# Patient Record
Sex: MALE | Race: WHITE | NOT HISPANIC OR LATINO | Employment: OTHER | URBAN - METROPOLITAN AREA
[De-identification: names, ages, dates, MRNs, and addresses within clinical notes are randomized per-mention and may not be internally consistent; named-entity substitution may affect disease eponyms.]

---

## 2024-08-20 ENCOUNTER — HOSPITAL ENCOUNTER (OUTPATIENT)
Facility: HOSPITAL | Age: 64
Discharge: HOME | End: 2024-08-20
Attending: EMERGENCY MEDICINE | Admitting: EMERGENCY MEDICINE
Payer: MEDICAID

## 2024-08-20 ENCOUNTER — APPOINTMENT (OUTPATIENT)
Dept: GENERAL RADIOLOGY | Facility: HOSPITAL | Age: 64
End: 2024-08-20
Payer: MEDICAID

## 2024-08-20 VITALS
WEIGHT: 185 LBS | RESPIRATION RATE: 18 BRPM | SYSTOLIC BLOOD PRESSURE: 154 MMHG | HEART RATE: 69 BPM | BODY MASS INDEX: 28.04 KG/M2 | HEIGHT: 68 IN | DIASTOLIC BLOOD PRESSURE: 77 MMHG | TEMPERATURE: 98.1 F | OXYGEN SATURATION: 96 %

## 2024-08-20 DIAGNOSIS — S62.663A CLOSED NONDISPLACED FRACTURE OF DISTAL PHALANX OF LEFT MIDDLE FINGER, INITIAL ENCOUNTER: Primary | ICD-10-CM

## 2024-08-20 PROCEDURE — G0463 HOSPITAL OUTPT CLINIC VISIT: HCPCS

## 2024-08-20 PROCEDURE — 73140 X-RAY EXAM OF FINGER(S): CPT

## 2024-08-20 RX ORDER — HYDROCODONE BITARTRATE AND ACETAMINOPHEN 5; 325 MG/1; MG/1
1 TABLET ORAL ONCE AS NEEDED
Status: DISCONTINUED | OUTPATIENT
Start: 2024-08-20 | End: 2024-08-20 | Stop reason: HOSPADM

## 2024-08-20 RX ADMIN — HYDROCODONE BITARTRATE AND ACETAMINOPHEN 1 TABLET: 5; 325 TABLET ORAL at 20:40

## 2024-08-21 NOTE — FSED PROVIDER NOTE
[facility]    EMERGENCY DEPARTMENT ENCOUNTER    Room Number:  08/08  Date seen:  8/20/2024  Time seen: 20:11 EDT  PCP: Junaid Bond MD  Historian: Patient    HPI:  Chief complaint: Finger injury  Context:Eric Calderón is a 64 y.o. male who presents to the ED with c/o finger injury.  Patient reports that he was moving a boat motor when it slipped smashing his left finger.  He reports he has been having bruising and swelling to the area since this occurred.  He reports that he feels like there is a lot of pressure inside of his finger.  He reports he has increased pain with movement.  Patient is nontoxic in appearance.    Timing: Constant  Duration: Today  Location: Left middle finger  Intensity/Severity: Moderate  Associated Symptoms: Left middle finger pain  Aggravating Factors: Movement, palpation  Alleviating Factors: No known alleviating      MEDICAL RECORD REVIEW  No chronic medical history reported    ALLERGIES  Patient has no known allergies.    PAST MEDICAL HISTORY  Active Ambulatory Problems     Diagnosis Date Noted    No Active Ambulatory Problems     Resolved Ambulatory Problems     Diagnosis Date Noted    No Resolved Ambulatory Problems     No Additional Past Medical History       PAST SURGICAL HISTORY  History reviewed. No pertinent surgical history.    FAMILY HISTORY  History reviewed. No pertinent family history.    SOCIAL HISTORY  Social History     Socioeconomic History    Marital status: Single       REVIEW OF SYSTEMS  Review of Systems    All systems reviewed and negative except for those discussed in HPI.     PHYSICAL EXAM    I have reviewed the triage vital signs and nursing notes.    ED Triage Vitals [08/20/24 1953]   Temp Heart Rate Resp BP SpO2   98.1 °F (36.7 °C) 69 14 154/77 96 %      Temp src Heart Rate Source Patient Position BP Location FiO2 (%)   -- -- -- -- --       Physical Exam  Constitutional:       Appearance: Normal appearance. He is  not toxic-appearing.   Cardiovascular:      Rate and Rhythm: Normal rate.      Pulses: Normal pulses.   Pulmonary:      Effort: Pulmonary effort is normal.   Musculoskeletal:      Left hand: Tenderness and bony tenderness present. No swelling. Decreased range of motion (Due to pain). Normal strength. Normal sensation. There is no disruption of two-point discrimination. Normal capillary refill. Normal pulse.        Arms:    Skin:     General: Skin is warm.      Capillary Refill: Capillary refill takes less than 2 seconds.   Neurological:      General: No focal deficit present.      Mental Status: He is alert.   Psychiatric:         Mood and Affect: Mood normal.         Behavior: Behavior normal.         Vital signs and nursing notes reviewed.        LAB RESULTS  No results found for this or any previous visit (from the past 24 hour(s)).    Ordered the above labs and independently reviewed the results.      RADIOLOGY RESULTS  XR Finger 2+ View Left    Result Date: 8/20/2024  XR FINGER 2+ VW LEFT Date of Exam: 8/20/2024 8:14 PM EDT Indication: middle finger injury Comparison: None available. Findings: The distal radius and ulna are intact. The carpal and metacarpal bones are intact. Obliquely oriented nondisplaced fracture of the third distal phalanx. The remaining phalanges are intact. Bony alignment is normal. No lytic or blastic disease.     Nondisplaced fracture of the distal aspect of the third distal phalanx. Electronically Signed: Gilberto Palomo MD  8/20/2024 8:23 PM EDT  Workstation ID: [id number]        I ordered the above noted radiological studies. Independently reviewed by me and discussed with radiologist.  See dictation above for official radiology interpretation.      Orders placed during this visit:  Orders Placed This Encounter   Procedures    XR Finger 2+ View Left    Application finger splint static           PROCEDURES    Procedures        MEDICATIONS GIVEN IN ER    Medications    HYDROcodone-acetaminophen (NORCO) 5-325 MG per tablet 1 tablet (1 tablet Oral Given 8/20/24 2040)         PROGRESS, DATA ANALYSIS, CONSULTS, AND MEDICAL DECISION MAKING    All labs and radiology studies have been independently reviewed by me.          AS OF 20:40 EDT VITALS:    BP - 154/77  HR - 69  TEMP - 98.1 °F (36.7 °C)  02 SATS - 96%    Medical Decision Making  MEDICAL DECISION  Patient is a 64-year-old male who presents today with left finger injury.  Hemodynamically stable, and shows no evidence of neurovascular injury or compartment syndrome.  Patient was placed in a finger splint for comfort.  We discussed discharge instructions.  Patient to follow-up with his primary care provider and/or hand specialist for continued evaluation.  He was given return precautions with understanding.      Problems Addressed:  Closed nondisplaced fracture of distal phalanx of left middle finger, initial encounter: complicated acute illness or injury    Amount and/or Complexity of Data Reviewed  Radiology: ordered.    Risk  Prescription drug management.          DIAGNOSIS  Final diagnoses:   Closed nondisplaced fracture of distal phalanx of left middle finger, initial encounter       New Medications Ordered This Visit   Medications    HYDROcodone-acetaminophen (NORCO) 5-325 MG per tablet 1 tablet           I performed hand hygiene on entry into the pt room and upon exit.      Part of this note may be an electronic transcription/translation of spoken language to printed text using the Dragon Dictation System.     Appropriate PPE worn during exam.    Dictated utilizing Dragon dictation     Note Disclaimer: At [facility], we believe that sharing information builds trust and better relationships. You are receiving this note because you recently visited [facility]. It is possible you will see health information before a provider has talked with you about it. This kind of information can be easy to misunderstand. To help  you fully understand what it means for your health, we urge you to discuss this note with your provider.

## 2024-08-21 NOTE — DISCHARGE INSTRUCTIONS
Thank you for letting us care for you today.  You can use Tylenol and ibuprofen as needed for pain.  Rest, ice, elevate.  You can apply ice for 20 minutes at a time.  You can wear the finger splint for comfort.  Follow-up with your primary care provider and/or the hand specialist for continued evaluation as needed.  Return to the emergency room for any new or worsening symptoms.